# Patient Record
Sex: FEMALE | Race: WHITE | URBAN - METROPOLITAN AREA
[De-identification: names, ages, dates, MRNs, and addresses within clinical notes are randomized per-mention and may not be internally consistent; named-entity substitution may affect disease eponyms.]

---

## 2021-09-27 ENCOUNTER — OFFICE VISIT (OUTPATIENT)
Dept: AUDIOLOGY | Facility: CLINIC | Age: 31
End: 2021-09-27
Payer: MEDICAID

## 2021-09-27 ENCOUNTER — OFFICE VISIT (OUTPATIENT)
Dept: OTOLARYNGOLOGY | Facility: CLINIC | Age: 31
End: 2021-09-27
Payer: MEDICAID

## 2021-09-27 VITALS — HEIGHT: 66 IN | WEIGHT: 135 LBS | BODY MASS INDEX: 21.69 KG/M2

## 2021-09-27 DIAGNOSIS — H90.3 SENSORINEURAL HEARING LOSS (SNHL) OF BOTH EARS: Primary | ICD-10-CM

## 2021-09-27 PROCEDURE — 3008F BODY MASS INDEX DOCD: CPT | Performed by: OTOLARYNGOLOGY

## 2021-09-27 PROCEDURE — 92567 TYMPANOMETRY: CPT | Performed by: AUDIOLOGIST

## 2021-09-27 PROCEDURE — 99243 OFF/OP CNSLTJ NEW/EST LOW 30: CPT | Performed by: OTOLARYNGOLOGY

## 2021-09-27 PROCEDURE — 92557 COMPREHENSIVE HEARING TEST: CPT | Performed by: AUDIOLOGIST

## 2021-09-27 RX ORDER — LEVOTHYROXINE SODIUM 0.03 MG/1
TABLET ORAL
COMMUNITY
Start: 2021-09-14

## 2021-09-27 NOTE — PROGRESS NOTES
Reynaldo Moreno is a 36year old female. Patient presents with:  Ear Problem: pt is here today for hearing loss in both ears. it has been going on for a few years now.          HISTORY OF PRESENT ILLNESS  9/27/2021   Here for evaluation of   hearing loss file      Marital Status: Not on file  Intimate Partner Violence:       Fear of Current or Ex-Partner: Not on file      Emotionally Abused: Not on file      Physically Abused: Not on file      Sexually Abused: Not on file  Housing Stability:       Unable t flat sensorineural hearing loss in both ears normal tympanograms SRT's are 45 bilaterally          Current Outpatient Medications:   •  levothyroxine 25 MCG Oral Tab, , Disp: , Rfl:     ASSESSMENT AND PLAN  1.  Sensorineural hearing loss (SNHL) of both ears